# Patient Record
Sex: FEMALE | Race: WHITE | URBAN - NONMETROPOLITAN AREA
[De-identification: names, ages, dates, MRNs, and addresses within clinical notes are randomized per-mention and may not be internally consistent; named-entity substitution may affect disease eponyms.]

---

## 2020-10-06 ENCOUNTER — OFFICE VISIT (OUTPATIENT)
Dept: PSYCHIATRY | Age: 60
End: 2020-10-06
Payer: COMMERCIAL

## 2020-10-06 PROCEDURE — 90791 PSYCH DIAGNOSTIC EVALUATION: CPT | Performed by: PSYCHOLOGIST

## 2020-10-06 PROCEDURE — 96130 PSYCL TST EVAL PHYS/QHP 1ST: CPT | Performed by: PSYCHOLOGIST

## 2020-10-06 PROCEDURE — 96131 PSYCL TST EVAL PHYS/QHP EA: CPT | Performed by: PSYCHOLOGIST

## 2020-10-06 NOTE — PROGRESS NOTES
ROUTINE PSYCHOLOGICAL EVALUATION FOR BARIATRIC SURGERY:    Mrs. Michel Runner is a 64year-old, female with morbid obesity (BMI = unknown), referred for a routine psychiatric evaluation for bariatric surgery. WEIGHT HISTORY    Michel Runner has considered bariatric surgery for: a good year. Overweight since: 5 years and gradual weight gain  Weight Loss Attempts include (self-directed/formal): decrease portions; more awareness; ordered weight loss pills (did not lose weight)    Eating Behaviors endorsed by patient: Patient endorsed poor food choices, large portions, some grazing, and eating in response to the time in addition to some boredom. However, she notes improvements in food choices, portion sizes (eyeball), grazing, and boredom. Caffeine and Carbonated beverages (last use/average use): Denies current caffeine use. Denies current carbonation use which is a decrease from previous use of 4-6 diet mountain dews per day. Exercise Habits: She reports knee pain leaves her physically limited. She reports doing chair exercises, and therabands at least once per day. Binging/Purging/Restrictive Behaviors (including SIV, laxative/stimulant abuse/obsessive exercise): denies. Pre-surgery Weight Loss Goal/Progress: She reports she has lost 19 pounds since starting weight management program.     KNOWLEDGE OF SURGERY/GOALS  Artem Glimpse understands the risks and benefits of bariatric surgery. Patient has realistic expectations and is motivated; has identified the following goals/reasons to lose weight:    1. Current medical co-morbidities: migraine headaches; pre-diabetic; depression (PCP); sleep apnea (CPAP); hypertension; stress urinary incontinence  3. Activity goals: physical limitations; COPD and emphysema; \"pouch;\" daily activities; showering; needs knee replacement    PSYCHOSOCIAL HISTORY  Marital status/lives with: . Has 2 adult aged children (45, 36) who live nearby.      Education Attainment: did not finish 8th grade. She reports a lot of moving around from home to home and did not have stability. She doesn't recall many memories when it comes to any learning difficulties/learning disability. Employment status (full-time/part-time, SSD, employment disability): is not employed, and does not receive disability. She reports they live off of 's income. 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 Bahai beliefs affecting medical care/transfusion/diet: denies     experience: denies   Relevant legal history/current issues: denies    Currently identified stressors include: grandkids  Current coping strategies include: playing on phone, read, used to swim    [address] [employer] [place] include: neighbor, best friend, daughter, and    Patient has at least 1 individual to stay with her 24/7 after surgery, provide transportation, and assist with medications and emotional support following surgery. Advanced Directive: , Gracia Moreno ([phone]). MENTAL HEALTH TREATMENT HISTORY  Has seen a psychiatrist/psychologist/ in the past (summary of previous treatment): saw a provider when suicidal in the past and only saw 1-2 times. Previous medication trials include:   Currently in outpatient treatment (e.g. psychotherapy, medication management):  Current psychiatric medications include: Prozac 20mg BID; amitrypyline HCL 50mg QD; Hospitalizations: denies   Previous Suicide Attempts: 25 years ago had thoughts of wanting to crash in a car. Denies intent. This was triggered by the deaths of her parents. History of depressive episodes: Denies episodes    PSYCHIATRIC SYMPTOMS  DEPRESSION: Denies depressed mood and anhedonia. Denies suicidal thoughts, plan, and intent. ANXIETY:  Denies excessive/uncontrollable worry, medical anxiety, and panic.      History of abuse/ trauma, tragedy: denies     History of [place]: denies     History of PSYCHOSIS: denies     History of INTERPERSONAL DIFFICULTY (e.g., anger management problems, impulsivity, conflict with medical staff/history of leaving AMA): denies    FAMILY PSYCHIATRIC HISTORY:  Brother - unsure, takes anti-psychotic     FAMILY CHEMICAL DEPENDENCY HISTORY:  Dad - alcohol abuse    SUBSTANCE USE HISTORY  (Including last use, average use, consequences related to use. )  Alcohol: Denies current alcohol use. Reports last used alcohol 10 years ago. Denies a history of problematic alcohol use. Tobacco: Reports 5 cigarettes per day which is a decrease from previous use of 1-1.5ppd for 40 years. She reports vaping occasionally with nicotine. She does not have a quit date as of yet. She reports her daughter and  do smoke a lot and this is an added temptation. Discussed the importance of being nicotine free for 90 days prior to surgery. Illicit Drugs: denies     Patient understands and accepts abstinence from alcohol, tobacco, and illicit drugs. SUBSTANCE TREATMENT HISTORY: denies    MENTAL STATUS EXAM  General appearance: dressed appropriately, obese, poor dental hygiene  Attitude & Behavior: pleasant and cooperative  Motor Activity & Musculoskeletal: no abnormal movement  Speech & Language: normal rate, volume, and prosody  Gait & Station: sitting  Mood: content  Affect: congruent with mood, appropriate to setting  Thought content: appropriate  Suicidal ideation: denies  Homicidal ideation: denies  Thought process & Associations: logical, coherent, goal-oriented  Perceptions: appropriate  Orientation: to person, place, and time  Attention & Concentration:   Recent & Remote Memory:  Executive function:  Visual spatial:  Fund of knowledge:  Insight:  Judgment:    NEUROCOGNITIVE FUNCTIONING  Patient denies a history of closed head injury, seizures, or stroke.      MEDICAL LITERACY:  REALM-R Score: 8/8  REALM-SF Score: 7/7  Score indicates a reading level of: > 9th grade reading level    MEDICAL NUMBERS  Will be documented in follow up progress note    Jens Cognitive Assessment: 21 of 30 (+1 point for low education)  Difficulty noted in the area(s) of:   Executive Functioning: 3/5; alternating trail making 0/1; copy cube 0/1; clock drawing 3/3. Attention / Concentration: 4/6; digits forward / backward 2/2; tapping A's 1/1; serial 7's 1/3. Language: 4/6; animal names 3/3; sentence repeat 0/2; word fluency 1/1. Abstraction: similarities 0/2. Memory: delayed item recall: 3/5 words at 5 minutes. Arousal / Orientation: 6/6. Patient exhibited impairment on brief screening: which is likely indicative of patient's baseline functioning given her level of education. Patient was given recommendation to elicit someone within identified support system to assist with reading through the Patient materials and attending future clinic appointments. DSM DIAGNOSTIC IMPRESSION  Psychological factors affecting medical condition   Nicotine use disorder   Morbid obesity     FORMULATION OF BARIATRIC ISSUES/PLANS:     PSYCHIATRIC ISSUES/plan: Patient denies current symptoms of depression and anxiety. She is currently prescribed Prozac 20mg BID, and amitriptyline 50mg QD. She reports a history of mental health care as she previously engaged in care for 1-2 sessions. Denies a history of psychiatric hospitalizations. Reports a single incident approximately 25 years ago at which time she reported suicidal thoughts and plan, denied intent. She reports thoughts were triggered by the death of her parents. Denies a history of depressive episodes. COGNITIVE FUNCTIONING/plan: Patient exhibited slight impairment on brief cognitive screener (21/30, normal 26/30) which is likely indicative of patient's baseline functioning given low level of education and previous type of employment. She appears to have adequate medical literacy and reads at a greater than 9th grade reading level.  Denies a significant

## 2020-11-11 ENCOUNTER — VIRTUAL VISIT (OUTPATIENT)
Dept: PSYCHIATRY | Age: 60
End: 2020-11-11
Payer: COMMERCIAL

## 2020-11-11 PROCEDURE — 90832 PSYTX W PT 30 MINUTES: CPT | Performed by: PSYCHOLOGIST

## 2020-11-11 NOTE — PROGRESS NOTES
TELEPSYCHOLOGY VISIT -- Audio (During GQAWT-26 public health emergency)    This session was conducted as a telepsychology visit due to one or more of the following COVID-19 risk factors being present in this patient:   The patient is aged 61 or older   The patient reports chronic health problems   The patient reports immune suppressed or immune compromised status   The patient reports being at risk or potentially exposed to the virus    Patient Location: Home     Provider Location: [facility]    Patient gave verbal consent for teleservices. This virtual visit was conducted via interactive/real-time audio. PSYCHOLOGICAL FOLLOW-UP FOR BARIATRIC SURGERY:      History of Presenting Illness:   Cm Michaud was initially referred for a routine psychological evaluation for bariatric surgery on 10/06/2020. At this evaluation, the following requirements were given prior to psychological clearance for WLS:    - eliminate all nicotine use prior to surgery. Assessment:    Eating behaviors: Denies mountain dew use in 5 weeks. She does note some mindless snacking that takes place in the evenings. She reports most times she can stop herself and change her behaviors. She reports she is now down to 294lbs which reflects a weight loss of 29 pounds (323lbs starting weight). She reports she has now been able to eliminate Metformin, amytriptiline decrease to 50mg QHS (previously 100mg QHS), Lisinopril 5mg QD (previously 10mg QD). She reports that weight management may prescribe her a weight loss medication. Reports being down to 2 cigarettes per day which is a decrease from previous use of 5 cigarettes per day. She reports she may be having knee surgery which will allow her to exercise more; however, she reports her BMI has to be under 50 prior to knee surgery. Patient notes that her preference would be to have knee surgery and then [facility] depending on what knee provider says at appointment on Monday. Discussed potential fears of \"messing up surgery\" after having it. She reports she is nervous because of the holidays and the different routines. She reports she plans to only have small tastes of items, using smaller plate, having healthier options readily available (fruit and veggie tray), and plans to continue to eat slower. Medical Numbers: 4/4 correct; patient able to complete basic math necessary for medication changes. Psychological Interpretation of MMPI-2: Patients MMPI-2 appears to be valid. Patient is a spike 5/2 which suggest that patient may not subscribe to traditional female gender roles and is likely to be assertive and being in control. Profile also suggests that patient may be experiencing symptoms of depression in some capacity. Patient reports that scale 5 can vary for her depending on the situation. She reports scale 2 sounds like her to a certain point, and typically only lasts in the moment versus days. She reports she tends to be more optimistic.      MENTAL STATUS EXAM  General appearance: dressed appropriately, obese, poor dental hygiene  Attitude & Behavior: pleasant and cooperative  Motor Activity & Musculoskeletal: no abnormal movement  Speech & Language: normal rate, volume, and prosody  Gait & Station: sitting  Mood: content  Affect: congruent with mood, appropriate to setting  Thought content: appropriate  Suicidal ideation: denies  Homicidal ideation: denies  Thought process & Associations: logical, coherent, goal-oriented  Perceptions: appropriate  Orientation: to person, place, and time  Fund of knowledge: average  Insight: average   Judgment: average     DSM DIAGNOSIS:  Psychological factors affecting medical condition   Nicotine use disorder   Morbid obesity     PLAN:  Angel Brand has completed minimum requirements and is now cleared from a psychological/substance perspective for bariatric surgery with the understanding that she must eliminate all nicotine use prior to surgery. Patient is in agreement. Psychology Clinician:  Carlos Isaacs, PhD     Start time: 10:00am  End time: 10:21am         Pursuant to the emergency declaration under the [address] [place] [address], [facility] waiver authority and the [employer] Act, this Virtual Visit was conducted, with patient's consent, to reduce the patient's risk of exposure to COVID-19 and provide continuity of care for an established patient. Services were provided through a video synchronous discussion virtually to substitute for in-person clinic visit.